# Patient Record
(demographics unavailable — no encounter records)

---

## 2024-10-08 NOTE — DISCUSSION/SUMMARY
[FreeTextEntry1] : Andi is a 6 month old female presenting for PCV vaccine. Tolerated vaccines well. Discussed vaccine risks and side effects with parents and they endorsed understanding. Discussed supportive care for local soreness/reaction and fever. RTO as needed.  [] : The components of the vaccine(s) to be administered today are listed in the plan of care. The disease(s) for which the vaccine(s) are intended to prevent and the risks have been discussed with the caretaker.  The risks are also included in the appropriate vaccination information statements which have been provided to the patient's caregiver.  The caregiver has given consent to vaccinate.

## 2025-01-14 NOTE — DEVELOPMENTAL MILESTONES
[Normal Development] : Normal Development [None] : none [Uses basic gestures] : uses basic gestures [Says "Neir" or "Mama"] : says "Neri" or "Mama" nonspecifically [Sits well without support] : sits well without support [Transitions between sitting and lying] : transitions between sitting and lying [Balances on hands and knees] : balances on hands and knees [Crawls] : crawls [Picks up small objects with 3 fingers] : picks up small objects with 3 fingers and thumb [Releases objects intentionally] : releases objects intentionally [[place] objects together] : bangs objects together [Yes] : Completed.

## 2025-01-14 NOTE — HISTORY OF PRESENT ILLNESS
[Parents] : parents [Well-balanced] : well-balanced [Breast milk] : breast milk [Fruit] : fruit [Vegetables] : vegetables [Normal] : Normal [In Crib] : sleeps in crib [On back] : sleeps on back [Sleeps 12-16 hours per 24 hours (including naps)] : sleeps 12-16 hours per 24 hours (including naps) [Pacifier use] : Pacifier use [Brushing teeth] : brushing teeth [Screen time only for video chatting] : screen time only for video chatting [No] : Not at  exposure [Water heater temperature set at <120 degrees F] : Water heater temperature set at <120 degrees F [Rear facing car seat in  back seat] : Rear facing car seat in  back seat [Carbon Monoxide Detectors] : Carbon monoxide detectors [Smoke Detectors] : Smoke detectors [Up to date] : Up to date [NO] : No [Co-sleeping] : no co-sleeping [Wakes up at night] : does not wake up at night [Loose bedding, pillow, toys, and/or bumpers in crib] : no loose bedding, pillow, toys, and/or bumpers in crib

## 2025-01-14 NOTE — DISCUSSION/SUMMARY
[Normal Growth] : growth [Normal Development] : development [None] : No known medical problems [No Elimination Concerns] : elimination [No Feeding Concerns] : feeding [No Skin Concerns] : skin [Normal Sleep Pattern] : sleep [Family Adaptation] : family adaptation [Infant [place]] : infant independence [Feeding Routine] : feeding routine [Safety] : safety [No Medications] : ~He/She~ is not on any medications [Parent/Guardian] : parent/guardian [Mother] : mother [Father] : father [] : The components of the vaccine(s) to be administered today are listed in the plan of care. The disease(s) for which the vaccine(s) are intended to prevent and the risks have been discussed with the caretaker.  The risks are also included in the appropriate vaccination information statements which have been provided to the patient's caregiver.  The caregiver has given consent to vaccinate. [FreeTextEntry1] : Andi is a 9 month old female presenting for WCC  Has been well in interval period Normal growth and development Normal physical exam   Hepatitis B vaccine given today  Discussed allergist referral for egg allergy + avoidance  RTO in 3 months for WCC or as needed  Continue breastmilk or formula as desired. Increase table foods, 3 meals with 2-3 snacks per day. Incorporate up to 6 oz of flourinated water daily in a sippy cup. Discussed weaning of bottle and pacifier. Wipe teeth daily with washcloth. When in car, patient should be in rear-facing car seat in back seat. Put baby to sleep in own crib with no loose or soft bedding. Lower crib matress. Help baby to maintain consistent daily routines and sleep schedule. Recognize stranger anxiety. Ensure home is safe since baby is increasingly mobile. Be within arm's reach of baby at all times. Use consistent, positive discipline. Avoid screen time. Read aloud to baby.

## 2025-01-14 NOTE — PHYSICAL EXAM
[Alert] : alert [Acute Distress] : no acute distress [Normocephalic] : normocephalic [Flat Open Anterior [place]] : flat open anterior fontanelle [Red Reflex] : red reflex bilateral [Excessive Tearing] : no excessive tearing [PERRL] : PERRL [Normally Placed Ears] : normally placed ears [Auricles Well Formed] : auricles well formed [Clear Tympanic membranes] : clear tympanic membranes [Light reflex present] : light reflex present [Bony landmarks visible] : bony landmarks visible [Discharge] : no discharge [Nares Patent] : nares patent [Palate Intact] : palate intact [Uvula Midline] : uvula midline [Supple, full passive range of motion] : supple, full passive range of motion [Palpable Masses] : no palpable masses [Symmetric Chest Rise] : symmetric chest rise [Clear to Auscultation Bilaterally] : clear to auscultation bilaterally [Regular Rate and Rhythm] : regular rate and rhythm [S1, S2 present] : S1, S2 present [Murmurs] : no murmurs [+2 Femoral Pulses] : (+) 2 femoral pulses [Soft] : soft [Tender] : nontender [Distended] : nondistended [Bowel Sounds] : bowel sounds present [Hepatomegaly] : no hepatomegaly [Splenomegaly] : no splenomegaly [Normal External Genitalia] : normal external genitalia [Clitoromegaly] : no clitoromegaly [Normal Vaginal Introitus] : normal vaginal introitus [No Abnormal Lymph Nodes Palpated] : no abnormal lymph nodes palpated [Symmetric abduction and rotation of hips] : symmetric abduction and rotation of hips [Allis Sign] : negative Allis sign [Straight] : straight [Cranial Nerves Grossly Intact] : cranial nerves grossly intact [Rash or Lesions] : no rash/lesions

## 2025-03-18 NOTE — DISCUSSION/SUMMARY
[Normal Growth] : growth [Normal Development] : development [None] : No known medical problems [No Elimination Concerns] : elimination [No Feeding Concerns] : feeding [No Skin Concerns] : skin [Normal Sleep Pattern] : sleep [Family Support] : family support [Establishing Routines] : establishing routines [Feeding and Appetite Changes] : feeding and appetite changes [Establishing A Dental Home] : establishing a dental home [Safety] : safety [No Medications] : ~He/She~ is not on any medications [Parent/Guardian] : parent/guardian [Mother] : mother [] : The components of the vaccine(s) to be administered today are listed in the plan of care. The disease(s) for which the vaccine(s) are intended to prevent and the risks have been discussed with the caretaker.  The risks are also included in the appropriate vaccination information statements which have been provided to the patient's caregiver.  The caregiver has given consent to vaccinate. [FreeTextEntry1] : Andi is a 12 month old female presenting for WCC  Has been well in interval period Normal growth and development  PE as noted- discussed supportive care and prn steroid use for eczema  Will RTO for vaccines CBC and lead drawn today and will follow up RTO for vaccines or as needed  Transition to whole cow's milk. Continue table foods, 3 meals with 2-3 snacks per day. Incorporate up to 6 oz of flourinated water daily in a sippy cup. Brush teeth twice a day with soft toothbrush. Recommend visit to dentist. When in car, keep child in rear-facing car seats until age 2, or until  the maximum height and weight for seat is reached. Put baby to sleep in own crib with no loose or soft bedding. Lower crib matress. Help baby to maintain consistent daily routines and sleep schedule. Recognize stranger and separation anxiety. Ensure home is safe since baby is increasingly mobile. Be within arm's reach of baby at all times. Use consistent, positive discipline. Avoid screen time. Read aloud to baby.

## 2025-03-18 NOTE — HISTORY OF PRESENT ILLNESS
[Parents] : parents [Formula ___ oz/feed] : [unfilled] oz of formula per feed [Table food] : table food [Normal] : Normal [Sippy cup use] : Sippy cup use [Brushing teeth] : Brushing teeth [No] : Not at  exposure [Water heater temperature set at <120 degrees F] : Water heater temperature set at <120 degrees F [Car seat in back seat] : Car seat in back seat [Smoke Detectors] : Smoke detectors [Exposure to electronic nicotine delivery system] : No exposure to electronic nicotine delivery system [Carbon Monoxide Detectors] : Carbon monoxide detectors [At risk for exposure to TB] : Not at risk for exposure to Tuberculosis [de-identified] : 24 ounces  [NO] : No

## 2025-04-29 NOTE — PHYSICAL EXAM
[NL] : moves all extremities x4, warm, well perfused x4 [Clear Rhinorrhea] : clear rhinorrhea [de-identified] : + xerosis + small patches of atopic dermatitis

## 2025-04-29 NOTE — DISCUSSION/SUMMARY
[] : The components of the vaccine(s) to be administered today are listed in the plan of care. The disease(s) for which the vaccine(s) are intended to prevent and the risks have been discussed with the caretaker.  The risks are also included in the appropriate vaccination information statements which have been provided to the patient's caregiver.  The caregiver has given consent to vaccinate. [FreeTextEntry1] : Andi is a 13 month old female presenting for travel advice and vaccine. Physical exam with some xerosis and mild eczema- discussed gentle skin care. Discussed travel precautions and importance of vaccination. MMR vaccine administered- Hepatitis A + varicella discussed and deferred to later date. RTO for catch up vaccines or as needed.

## 2025-04-29 NOTE — HISTORY OF PRESENT ILLNESS
[de-identified] : Travel advice encounter + eczema + vaccine  [FreeTextEntry6] : Andi is a 13 month old female presenting for travel advice encounter + vaccine. Has been congested --no cough and no fever. Drinking and voiding well. Mom would like to discuss skin and how to help prevent eczema. Headed to [place] soon.

## 2025-07-01 NOTE — PHYSICAL EXAM
[Alert] : alert [No Acute Distress] : no acute distress [Normocephalic] : normocephalic [Anterior [employer] Closed] : anterior fontanelle closed [Red Reflex Bilateral] : red reflex bilateral [PERRL] : PERRL [Normally Placed Ears] : normally placed ears [Auricles Well Formed] : auricles well formed [Clear Tympanic membranes with present light reflex and bony landmarks] : clear tympanic membranes with present light reflex and bony landmarks [No Discharge] : no discharge [Nares Patent] : nares patent [Palate Intact] : palate intact [Uvula Midline] : uvula midline [Tooth Eruption] : tooth eruption  [Supple, full passive range of motion] : supple, full passive range of motion [No Palpable Masses] : no palpable masses [Symmetric Chest Rise] : symmetric chest rise [Clear to Auscultation Bilaterally] : clear to auscultation bilaterally [Regular Rate and Rhythm] : regular rate and rhythm [S1, S2 present] : S1, S2 present [No Murmurs] : no murmurs [+2 Femoral Pulses] : +2 femoral pulses [Soft] : soft [NonTender] : non tender [Non Distended] : non distended [Normoactive Bowel Sounds] : normoactive bowel sounds [No Hepatomegaly] : no hepatomegaly [No Splenomegaly] : no splenomegaly [[place] 1] : Homer 1 [No Clitoromegaly] : no clitoromegaly [Normal Vaginal Introitus] : normal vaginal introitus [Patent] : patent [Normally Placed] : normally placed [No Abnormal Lymph Nodes Palpated] : no abnormal lymph nodes palpated [No Clavicular Crepitus] : no clavicular crepitus [Negative Jain-Ortalani] : negative Jain-Ortalani [Symmetric Buttocks Creases] : symmetric buttocks creases [No Spinal Dimple] : no spinal dimple [NoTuft of Hair] : no tuft of hair [Cranial Nerves Grossly Intact] : cranial nerves grossly intact [No Rash or Lesions] : no rash or lesions

## 2025-07-01 NOTE — DISCUSSION/SUMMARY
[Normal Growth] : growth [Normal Development] : development [None] : No known medical problems [No Elimination Concerns] : elimination [No Feeding Concerns] : feeding [No Skin Concerns] : skin [Normal Sleep Pattern] : sleep [Communication and Social Development] : communication and social development [Sleep Routines and Issues] : sleep routines and issues [Temper Tantrums and Discipline] : temper tantrums and discipline [Healthy Teeth] : healthy teeth [Safety] : safety [No Medications] : ~He/She~ is not on any medications [Parent/Guardian] : parent/guardian [Mother] : mother [Father] : father [] : The components of the vaccine(s) to be administered today are listed in the plan of care. The disease(s) for which the vaccine(s) are intended to prevent and the risks have been discussed with the caretaker.  The risks are also included in the appropriate vaccination information statements which have been provided to the patient's caregiver.  The caregiver has given consent to vaccinate. [FreeTextEntry1] : Andi is a 15 month old female presenting for WCC  Has been well in interval period Normal growth and development Normal physical exam  PCV vaccine administered RTO in 3 months for WCC or as needed  Continue whole cow's milk. Continue table foods, 3 meals with 2-3 snacks per day. Incorporate flourinated water daily in a sippy cup. Brush teeth twice a day with soft toothbrush. Recommend visit to dentist. When in car, keep child in rear-facing car seats until age 2, or until  the maximum height and weight for seat is reached. Put baby to sleep in own crib. Lower crib matress. Help baby to maintain consistent daily routines and sleep schedule. Recognize stranger and separation anxiety. Ensure home is safe since baby is increasingly mobile. Be within arm's reach of baby at all times. Use consistent, positive discipline. Read aloud to baby.  Return in 3 mo for 18 mo well child check.   Continue whole cow's milk. Continue table foods, 3 meals with 2-3 snacks per day. Incorporate flourinated water daily in a sippy cup. Brush teeth twice a day with soft toothbrush. Recommend visit to dentist. When in car, keep child in rear-facing car seats until age 2, or until  the maximum height and weight for seat is reached. Put baby to sleep in own crib. Lower crib matress. Help baby to maintain consistent daily routines and sleep schedule. Recognize stranger and separation anxiety. Ensure home is safe since baby is increasingly mobile. Be within arm's reach of baby at all times. Use consistent, positive discipline. Read aloud to baby.

## 2025-07-01 NOTE — HISTORY OF PRESENT ILLNESS
[Normal] : Normal [Sippy cup use] : Sippy cup use [Brushing teeth] : Brushing teeth [Toothpaste] : Primary Fluoride Source: Toothpaste [No] : Not at  exposure [Water heater temperature set at <120 degrees F] : Water heater temperature set at <120 degrees F [Car seat in back seat] : Car seat in back seat [Carbon Monoxide Detectors] : Carbon monoxide detectors [Smoke Detectors] : Smoke detectors [Up to date] : Up to date [NO] : No [Parents] : parents [Cow's milk (Ounces per day ___)] : consumes [unfilled] oz of cow's milk per day [Table food] : table food [Exposure to electronic nicotine delivery system] : No exposure to electronic nicotine delivery system [de-identified] : 18 ounces

## 2025-07-07 NOTE — DISCUSSION/SUMMARY
[FreeTextEntry1] :  Fifteen month old female with febrile illness/ Viral Syndrome. Recommend using Tylenol and alternate with Motrin.  If fever persists after 48 hours to return to office. In order to maintain hydration consume "oral rehydration solution," such as Pedialyte or low calorie sports drinks. If vomiting, try to give child a few teaspoons of fluid every few minutes. Avoid drinking juice or soda. These can make diarrhea worse. If tolerating solids, its best to consume lean meats, fruits, vegetables, and whole-grain breads and cereals. Avoid eating foods with a lot of fat or sugar, which can make symptoms worse.

## 2025-07-07 NOTE — HISTORY OF PRESENT ILLNESS
[FreeTextEntry6] :  Fifteen month old female brought to the office because of fever since yesterday . T max 102.5. Had episode of vomiting today and has been having loose stools at home. She vomited  while in exam room as well. Responds to Tylenol but temp comes back after short time ( 1-2 hours)

## 2025-07-07 NOTE — PHYSICAL EXAM
[Cerumen in canal] : cerumen in canal [Bilateral] : (bilateral) [Inflamed Nasal Mucosa] : inflamed nasal mucosa [NL] : warm, clear [FreeTextEntry1] : happy in no distress until exam when she started to vomit [FreeTextEntry3] : unable to visualize TMs. Mom refused ear cleaning

## 2025-07-11 NOTE — DISCUSSION/SUMMARY
[FreeTextEntry1] : Andi is a 15 month old female presenting for fussiness. Physical exam with left OM. Discussed supportive care and amoxicillin course(risks and side effect profile) with parents and they endorsed understanding. RTO in 2-3 weeks for OM follow up or sooner if symptoms do not improve.

## 2025-07-11 NOTE — HISTORY OF PRESENT ILLNESS
[de-identified] : ear tugging/vomiting [FreeTextEntry6] : Andi is a 15 month old female presenting for ear tugging and vomiting. Seen in office few days ago for fever- fever stopped 1 day ago but continues to be fussy, congested and now with vomiting. 2-3 NBNB episodes. Drinking and voiding well but not eating well.

## 2025-07-11 NOTE — REVIEW OF SYSTEMS
[Fever] : fever [Fussy] : fussy [Congestion] : congestion [Vomiting] : vomiting [Negative] : Genitourinary

## 2025-07-11 NOTE — HISTORY OF PRESENT ILLNESS
[de-identified] : ear tugging/vomiting [FreeTextEntry6] : Andi is a 15 month old female presenting for ear tugging and vomiting. Seen in office few days ago for fever- fever stopped 1 day ago but continues to be fussy, congested and now with vomiting. 2-3 NBNB episodes. Drinking and voiding well but not eating well.

## 2025-07-28 NOTE — HISTORY OF PRESENT ILLNESS
[de-identified] : follow up OM  [FreeTextEntry6] : Andi is a 16 month old female presenting for follow up OM. Doing well. Afebrile.

## 2025-07-28 NOTE — DISCUSSION/SUMMARY
[FreeTextEntry1] : Andi is a 16 month old female presenting for follow up OM. Physical exam notable for resolution of OM. RTO for catch up vaccines or as needed.